# Patient Record
Sex: FEMALE | URBAN - METROPOLITAN AREA
[De-identification: names, ages, dates, MRNs, and addresses within clinical notes are randomized per-mention and may not be internally consistent; named-entity substitution may affect disease eponyms.]

---

## 2019-08-22 ENCOUNTER — Encounter (OUTPATIENT)
Dept: URBAN - METROPOLITAN AREA CLINIC 71 | Facility: CLINIC | Age: 79
End: 2019-08-22
Payer: COMMERCIAL

## 2019-08-22 PROCEDURE — 92014 COMPRE OPH EXAM EST PT 1/>: CPT | Performed by: OPTOMETRIST

## 2019-11-21 ENCOUNTER — Encounter (OUTPATIENT)
Dept: URBAN - METROPOLITAN AREA CLINIC 71 | Facility: CLINIC | Age: 79
End: 2019-11-21
Payer: MEDICARE

## 2019-11-21 PROCEDURE — 92134 CPTRZ OPH DX IMG PST SGM RTA: CPT | Performed by: OPTOMETRIST

## 2019-11-21 PROCEDURE — 92014 COMPRE OPH EXAM EST PT 1/>: CPT | Performed by: OPTOMETRIST

## 2020-06-18 ENCOUNTER — OFFICE VISIT (OUTPATIENT)
Dept: URBAN - METROPOLITAN AREA CLINIC 71 | Facility: CLINIC | Age: 80
End: 2020-06-18
Payer: MEDICARE

## 2020-06-18 DIAGNOSIS — H11.001 UNSPECIFIED PTERYGIUM OF RIGHT EYE: ICD-10-CM

## 2020-06-18 DIAGNOSIS — H52.4 PRESBYOPIA: ICD-10-CM

## 2020-06-18 PROCEDURE — 92014 COMPRE OPH EXAM EST PT 1/>: CPT | Performed by: OPTOMETRIST

## 2020-06-18 RX ORDER — METOPROLOL TARTRATE 50 MG/1
50 MG TABLET, FILM COATED ORAL TWICE DAILY
Qty: 0 | Refills: 0 | Status: ACTIVE
Start: 2020-06-18

## 2020-06-18 RX ORDER — LISINOPRIL 10 MG/1
10 MG TABLET ORAL ONCE DAILY
Qty: 0 | Refills: 0 | Status: ACTIVE
Start: 2020-06-18

## 2020-06-18 RX ORDER — SIMVASTATIN 5 MG/1
5 MG TABLET, FILM COATED ORAL ONCE DAILY
Qty: 0 | Refills: 0 | Status: ACTIVE
Start: 2020-06-18

## 2020-06-18 RX ORDER — RIVAROXABAN 20 MG/1
20 MG TABLET, FILM COATED ORAL
Qty: 0 | Refills: 0 | Status: ACTIVE
Start: 2020-06-18

## 2020-06-18 ASSESSMENT — INTRAOCULAR PRESSURE
OS: 11
OD: 11

## 2020-06-18 ASSESSMENT — KERATOMETRY
OS: 41.88
OD: 41.50

## 2020-06-18 ASSESSMENT — VISUAL ACUITY
OD: 20/25
OS: 20/20

## 2020-06-18 NOTE — IMPRESSION/PLAN
Impression: Combined forms of age-related cataract, bilateral: H25.813. Plan: Pt qualifies for cataract sx, but elects to wait for any sx.

## 2021-07-22 ENCOUNTER — OFFICE VISIT (OUTPATIENT)
Dept: URBAN - METROPOLITAN AREA CLINIC 71 | Facility: CLINIC | Age: 81
End: 2021-07-22
Payer: MEDICARE

## 2021-07-22 DIAGNOSIS — H43.813 VITREOUS DEGENERATION, BILATERAL: ICD-10-CM

## 2021-07-22 DIAGNOSIS — H25.813 COMBINED FORMS OF AGE-RELATED CATARACT, BILATERAL: Primary | ICD-10-CM

## 2021-07-22 DIAGNOSIS — H35.3131 NONEXUDATIVE AGE-RELATED MACULAR DEGENERATION, BILATERAL, EARLY DRY STAGE: ICD-10-CM

## 2021-07-22 PROCEDURE — 92133 CPTRZD OPH DX IMG PST SGM ON: CPT | Performed by: OPTOMETRIST

## 2021-07-22 PROCEDURE — 92134 CPTRZ OPH DX IMG PST SGM RTA: CPT | Performed by: OPTOMETRIST

## 2021-07-22 PROCEDURE — 99214 OFFICE O/P EST MOD 30 MIN: CPT | Performed by: OPTOMETRIST

## 2021-07-22 ASSESSMENT — INTRAOCULAR PRESSURE
OS: 14
OD: 14

## 2021-07-22 ASSESSMENT — VISUAL ACUITY
OS: 20/30
OD: 20/25

## 2021-07-22 ASSESSMENT — KERATOMETRY
OS: 42.00
OD: 42.00

## 2021-07-22 NOTE — IMPRESSION/PLAN
Impression: Combined forms of age-related cataract, bilateral: H25.813. Plan: Cataracts are progressing but due to the AMD and quality of vision recommend She hold off on CAT SX until her vision really starts to bother her so she can see an improvement in her vision. No treatment currently recommended. The patient will monitor vision changes and contact us with any decrease in vision.

## 2021-07-22 NOTE — IMPRESSION/PLAN
Impression: Presbyopia: H52.4. Plan: Presbyopia is the inability to focus on objects (ie: accommodate) due to the loss of flexibility of your natural lens. Presbyopia occurs with age. Reading glasses, bifocals, trifocals or contacts can be helpful. Contact the office if difficulty focusing persists despite corrective eye wear. 

New glasses RX given today for DVO

## 2021-07-22 NOTE — IMPRESSION/PLAN
Impression: Nonexudative age-related macular degeneration, bilateral, early dry stage: H35.3131. Plan: The clinical exam and OCT are stable without evidence of progression. The patient was advised to continue AREDs multivitamins. The patient was also advised to continue to monitor the [place] grid, avoid smoking, and call us immediately with any visual changes.

## 2022-07-26 ENCOUNTER — OFFICE VISIT (OUTPATIENT)
Dept: URBAN - METROPOLITAN AREA CLINIC 71 | Facility: CLINIC | Age: 82
End: 2022-07-26
Payer: MEDICARE

## 2022-07-26 DIAGNOSIS — H43.813 VITREOUS DEGENERATION, BILATERAL: ICD-10-CM

## 2022-07-26 DIAGNOSIS — H25.813 COMBINED FORMS OF AGE-RELATED CATARACT, BILATERAL: ICD-10-CM

## 2022-07-26 DIAGNOSIS — H35.3131 NONEXUDATIVE AGE-RELATED MACULAR DEGENERATION, BILATERAL, EARLY DRY STAGE: Primary | ICD-10-CM

## 2022-07-26 PROCEDURE — 99214 OFFICE O/P EST MOD 30 MIN: CPT | Performed by: OPTOMETRIST

## 2022-07-26 PROCEDURE — 92134 CPTRZ OPH DX IMG PST SGM RTA: CPT | Performed by: OPTOMETRIST

## 2022-07-26 ASSESSMENT — INTRAOCULAR PRESSURE
OS: 12
OD: 13

## 2022-07-26 NOTE — IMPRESSION/PLAN
Impression: Combined forms of age-related cataract, bilateral: H25.813. Plan: Cataracts are stable and PT notes her vision is not bothering her at this time. No treatment currently recommended. The patient will monitor vision changes and contact us with any decrease in vision.

## 2022-07-26 NOTE — IMPRESSION/PLAN
Impression: Vitreous degeneration, bilateral: H43.813. Plan: The PVD is stable, and there is no evidence of a retinal tear or detachment on dilated exam.  I reviewed the signs and symptoms of a retinal tear and detachment in detail with the patient, including worsening flashes, new floaters, and development of a shadow/curtain in the peripheral visual field. The patient was advised to call immediately with any changes to [facility] or increase in symptoms.

## 2022-09-08 ENCOUNTER — OFFICE VISIT (OUTPATIENT)
Dept: URBAN - METROPOLITAN AREA CLINIC 71 | Facility: CLINIC | Age: 82
End: 2022-09-08
Payer: COMMERCIAL

## 2022-09-08 DIAGNOSIS — H35.3131 NONEXUDATIVE AGE-RELATED MACULAR DEGENERATION, BILATERAL, EARLY DRY STAGE: ICD-10-CM

## 2022-09-08 DIAGNOSIS — H52.4 PRESBYOPIA: Primary | ICD-10-CM

## 2022-09-08 DIAGNOSIS — H25.13 AGE-RELATED NUCLEAR CATARACT, BILATERAL: ICD-10-CM

## 2022-09-08 PROCEDURE — 92014 COMPRE OPH EXAM EST PT 1/>: CPT | Performed by: OPTOMETRIST

## 2022-09-08 ASSESSMENT — INTRAOCULAR PRESSURE
OS: 12
OD: 15

## 2022-09-08 ASSESSMENT — VISUAL ACUITY
OS: 20/20
OD: 20/25

## 2022-09-08 NOTE — IMPRESSION/PLAN
Impression: Age-related nuclear cataract, bilateral: H25.13. Plan: Cataracts are stable and PT is not bothered by her vision. No treatment currently recommended. The patient will monitor vision changes and contact us with any decrease in vision.

## 2022-09-08 NOTE — IMPRESSION/PLAN
Impression: Nonexudative age-related macular degeneration, bilateral, early dry stage: H35.3131. Plan: Stable. continue the eye vitamins as directed.  Follow as planned for annual exam

## 2022-09-08 NOTE — IMPRESSION/PLAN
Impression: Presbyopia: H52.4. Plan: Presbyopia is the inability to focus on objects (ie: accommodate) due to the loss of flexibility of your natural lens. Presbyopia occurs with age. Reading glasses, bifocals, trifocals or contacts can be helpful. Contact the office if difficulty focusing persists despite corrective eye wear. New glasses RX given today for DVO and Music RX. PT declines update in [facility] RX. Changes in the prescription discussed. Discussed the combination of cataracts and early AMD and the effect it can have on her vision making it variable.

## 2023-02-16 ENCOUNTER — OFFICE VISIT (OUTPATIENT)
Dept: URBAN - METROPOLITAN AREA CLINIC 71 | Facility: CLINIC | Age: 83
End: 2023-02-16
Payer: MEDICARE

## 2023-02-16 DIAGNOSIS — H25.13 AGE-RELATED NUCLEAR CATARACT, BILATERAL: ICD-10-CM

## 2023-02-16 DIAGNOSIS — H53.2 DIPLOPIA: Primary | ICD-10-CM

## 2023-02-16 PROCEDURE — 92004 COMPRE OPH EXAM NEW PT 1/>: CPT | Performed by: PHYSICIAN ASSISTANT

## 2023-02-16 ASSESSMENT — INTRAOCULAR PRESSURE
OS: 15
OD: 13

## 2023-02-16 NOTE — IMPRESSION/PLAN
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 No masses palpated or any apparent causes. Discussed findings. Will order MRI and MRA of brain and orbits to r/o pathology. Possible slight exo os on arce tiffany. Discussed that may be early CN4 in which case will worsen and then self-resolve with BP, BS, and cholesterol changes as most common etiology. Pt states that recent labs were WNL. Answered all questions. Will send copy of today's visit to PCP.

## 2023-07-20 ENCOUNTER — OFFICE VISIT (OUTPATIENT)
Dept: URBAN - METROPOLITAN AREA CLINIC 71 | Facility: CLINIC | Age: 83
End: 2023-07-20
Payer: MEDICARE

## 2023-07-20 DIAGNOSIS — H25.13 AGE-RELATED NUCLEAR CATARACT, BILATERAL: ICD-10-CM

## 2023-07-20 DIAGNOSIS — H52.4 PRESBYOPIA: ICD-10-CM

## 2023-07-20 DIAGNOSIS — H35.3131 NONEXUDATIVE AGE-RELATED MACULAR DEGENERATION, BILATERAL, EARLY DRY STAGE: Primary | ICD-10-CM

## 2023-07-20 PROCEDURE — 92134 CPTRZ OPH DX IMG PST SGM RTA: CPT | Performed by: OPTOMETRIST

## 2023-07-20 PROCEDURE — 99214 OFFICE O/P EST MOD 30 MIN: CPT | Performed by: OPTOMETRIST

## 2023-07-20 ASSESSMENT — INTRAOCULAR PRESSURE
OD: 14
OS: 15

## 2023-07-20 ASSESSMENT — VISUAL ACUITY
OS: 20/20
OD: 20/30

## 2023-07-20 NOTE — IMPRESSION/PLAN
Impression: Presbyopia: H52.4. Plan: Presbyopia is the inability to focus on objects (ie: accommodate) due to the loss of flexibility of your natural lens. Presbyopia occurs with age. Reading glasses, bifocals, trifocals or contacts can be helpful. Contact the office if difficulty focusing persists despite corrective eye wear. New glasses RX given today for DVO 
PT declines update in NVO RX. Changes in the prescription discussed. Discussed the combination of cataracts and early AMD and the effect it can have on her vision making it variable.

## 2023-07-20 NOTE — IMPRESSION/PLAN
Impression: Nonexudative age-related macular degeneration, bilateral, early dry stage: H35.3131. OCT ordered and reviewed today. No leakage noted OU. Plan: Stable. continue the eye vitamins as directed.  Follow as planned for annual exam

## 2024-12-11 ENCOUNTER — OFFICE VISIT (OUTPATIENT)
Dept: URBAN - METROPOLITAN AREA CLINIC 71 | Facility: CLINIC | Age: 84
End: 2024-12-11
Payer: MEDICARE

## 2024-12-11 DIAGNOSIS — H43.813 VITREOUS DEGENERATION, BILATERAL: ICD-10-CM

## 2024-12-11 DIAGNOSIS — H52.4 PRESBYOPIA: ICD-10-CM

## 2024-12-11 DIAGNOSIS — H35.372 PUCKERING OF MACULA, LEFT EYE: ICD-10-CM

## 2024-12-11 DIAGNOSIS — H11.001 UNSPECIFIED PTERYGIUM OF RIGHT EYE: ICD-10-CM

## 2024-12-11 DIAGNOSIS — H25.813 COMBINED FORMS OF AGE-RELATED CATARACT, BILATERAL: ICD-10-CM

## 2024-12-11 DIAGNOSIS — H35.3131 NONEXUDATIVE AGE-RELATED MACULAR DEGENERATION, BILATERAL, EARLY DRY STAGE: Primary | ICD-10-CM

## 2024-12-11 DIAGNOSIS — H35.341 MACULAR CYST, HOLE, OR PSEUDOHOLE, RIGHT EYE: ICD-10-CM

## 2024-12-11 DIAGNOSIS — H35.443 AGE-RELATED RETICULAR DEGENERATION OF RETINA, BILATERAL: ICD-10-CM

## 2024-12-11 PROCEDURE — 92134 CPTRZ OPH DX IMG PST SGM RTA: CPT | Performed by: OPTOMETRIST

## 2024-12-11 PROCEDURE — 99213 OFFICE O/P EST LOW 20 MIN: CPT | Performed by: OPTOMETRIST

## 2024-12-11 PROCEDURE — 92133 CPTRZD OPH DX IMG PST SGM ON: CPT | Performed by: OPTOMETRIST

## 2024-12-11 ASSESSMENT — VISUAL ACUITY
OS: 20/30
OD: 20/60